# Patient Record
Sex: FEMALE | Race: WHITE | Employment: OTHER | URBAN - NONMETROPOLITAN AREA
[De-identification: names, ages, dates, MRNs, and addresses within clinical notes are randomized per-mention and may not be internally consistent; named-entity substitution may affect disease eponyms.]

---

## 2018-01-13 ENCOUNTER — HOSPITAL ENCOUNTER (EMERGENCY)
Age: 53
Discharge: HOME | End: 2018-01-13
Payer: COMMERCIAL

## 2018-01-13 VITALS
RESPIRATION RATE: 20 BRPM | WEIGHT: 175 LBS | OXYGEN SATURATION: 95 % | SYSTOLIC BLOOD PRESSURE: 155 MMHG | TEMPERATURE: 97.5 F | DIASTOLIC BLOOD PRESSURE: 84 MMHG | HEART RATE: 94 BPM

## 2018-01-13 DIAGNOSIS — J18.9 PNEUMONIA OF LEFT LOWER LOBE DUE TO INFECTIOUS ORGANISM: Primary | ICD-10-CM

## 2018-01-13 PROCEDURE — 99203 OFFICE O/P NEW LOW 30 MIN: CPT

## 2018-01-13 PROCEDURE — 99202 OFFICE O/P NEW SF 15 MIN: CPT | Performed by: NURSE PRACTITIONER

## 2018-01-13 RX ORDER — PREDNISONE 20 MG/1
20 TABLET ORAL 2 TIMES DAILY
Qty: 10 TABLET | Refills: 0 | Status: SHIPPED | OUTPATIENT
Start: 2018-01-13 | End: 2018-01-18

## 2018-01-13 RX ORDER — SIMVASTATIN 10 MG
10 TABLET ORAL NIGHTLY
COMMUNITY

## 2018-01-13 RX ORDER — LEVOFLOXACIN 500 MG/1
500 TABLET, FILM COATED ORAL DAILY
Qty: 7 TABLET | Refills: 0 | Status: SHIPPED | OUTPATIENT
Start: 2018-01-13 | End: 2018-01-20

## 2018-01-13 RX ORDER — BENZONATATE 200 MG/1
200 CAPSULE ORAL 3 TIMES DAILY PRN
Qty: 21 CAPSULE | Refills: 0 | Status: SHIPPED | OUTPATIENT
Start: 2018-01-13 | End: 2018-01-20

## 2018-01-13 RX ORDER — ESOMEPRAZOLE MAGNESIUM 40 MG/1
40 FOR SUSPENSION ORAL DAILY
COMMUNITY

## 2018-01-13 RX ORDER — ESCITALOPRAM OXALATE 20 MG/1
10 TABLET ORAL DAILY
COMMUNITY

## 2018-01-13 ASSESSMENT — PAIN DESCRIPTION - FREQUENCY: FREQUENCY: CONTINUOUS

## 2018-01-13 ASSESSMENT — PAIN DESCRIPTION - DESCRIPTORS: DESCRIPTORS: ACHING

## 2018-01-13 ASSESSMENT — PAIN DESCRIPTION - PAIN TYPE: TYPE: ACUTE PAIN

## 2018-01-13 ASSESSMENT — PAIN SCALES - GENERAL: PAINLEVEL_OUTOF10: 7

## 2018-01-13 ASSESSMENT — PAIN DESCRIPTION - LOCATION: LOCATION: HEAD;THROAT

## 2018-01-17 ASSESSMENT — ENCOUNTER SYMPTOMS
WHEEZING: 1
SORE THROAT: 1
ABDOMINAL PAIN: 0
COUGH: 1
SHORTNESS OF BREATH: 0

## 2019-09-18 NOTE — ED PROVIDER NOTES
----- Message from [employer]. sent at 9/18/2019  9:12 AM EDT -----  Regarding: Dr. Perla Dawkins General Message/Vendor Calls    Caller's first and last name: Thaddeus Mora (mother)      Reason for call: Regarding she is running about 45 mins late for her sons apt, she has her children back to back apts.        Call back required yes/no and why: No      Best contact number(s): [phone]      Details to clarify the request:      [employer]. Labs:No results found for this visit on 01/13/18. IMAGING:    URGENT CARE COURSE:     Vitals:    01/13/18 0942 01/13/18 1017   BP: (!) 175/95 (!) 155/84   Pulse: 94    Resp: 20    Temp: 97.5 °F (36.4 °C)    TempSrc: Temporal    SpO2: 95%    Weight: 175 lb (79.4 kg)      Patient appears ill but nontoxic. No distress noted with breathing. Left lower lobe with significant crackles; very harsh wet cough. Rest of lung sounds clear to ascultation. Bilateral tympanic membranes clear and intact. Right superficial adenopathy noted. Oropharynx without erythema. Sinuses nontender. Heart Sounds regular, S1,S2. Patient agreed to empiric treatment of left lower lobe pneumonia, to help avoid her deductible insurance costs, and avoid radiation. Medications - No data to display  PROCEDURES:  None  FINAL IMPRESSION      1. Pneumonia of left lower lobe due to infectious organism [facility])        DISPOSITION/PLAN   DISPOSITION Decision To Discharge 01/13/2018 09:57:30 AM    PATIENT REFERRED TO:  Susan Lombardi MD  [address]  [phone]    In 3 days  If symptoms worsen    DISCHARGE MEDICATIONS:  Discharge Medication List as of 1/13/2018 10:04 AM      START taking these medications    Details   levofloxacin (LEVAQUIN) 500 MG tablet Take 1 tablet by mouth daily for 7 days, Disp-7 tablet, R-0Normal      predniSONE (DELTASONE) 20 MG tablet Take 1 tablet by mouth 2 times daily for 5 days, Disp-10 tablet, R-0Normal      benzonatate (TESSALON) 200 MG capsule Take 1 capsule by mouth 3 times daily as needed for Cough, Disp-21 capsule, R-0Normal           Discharge Medication List as of 1/13/2018 10:04 AM          Bob Alvarado, NAYA Alvarado, NAYA  01/13/18 [address], NP  01/17/18 3365

## 2021-12-22 ENCOUNTER — HOSPITAL ENCOUNTER (OUTPATIENT)
Age: 56
Discharge: HOME | End: 2021-12-22
Payer: COMMERCIAL

## 2021-12-22 PROCEDURE — U0005 INFEC AGEN DETEC AMPLI PROBE: HCPCS

## 2021-12-22 PROCEDURE — U0003 INFECTIOUS AGENT DETECTION BY NUCLEIC ACID (DNA OR RNA); SEVERE ACUTE RESPIRATORY SYNDROME CORONAVIRUS 2 (SARS-COV-2) (CORONAVIRUS DISEASE [COVID-19]), AMPLIFIED PROBE TECHNIQUE, MAKING USE OF HIGH THROUGHPUT TECHNOLOGIES AS DESCRIBED BY CMS-2020-01-R: HCPCS

## 2021-12-23 ENCOUNTER — ANESTHESIA EVENT (OUTPATIENT)
Dept: OPERATING ROOM | Age: 56
End: 2021-12-23
Payer: COMMERCIAL

## 2021-12-23 LAB
SARS-COV-2: NOT DETECTED
SOURCE: NORMAL

## 2021-12-23 RX ORDER — UBIDECARENONE 200 MG
200 CAPSULE ORAL DAILY
COMMUNITY

## 2021-12-23 RX ORDER — EPINEPHRINE 0.3 MG/.3ML
0.3 INJECTION SUBCUTANEOUS PRN
COMMUNITY

## 2021-12-23 RX ORDER — LOSARTAN POTASSIUM 100 MG/1
100 TABLET ORAL DAILY
COMMUNITY

## 2021-12-23 RX ORDER — OMEGA-3S/DHA/EPA/FISH OIL/D3 300MG-1000
500 CAPSULE ORAL DAILY
COMMUNITY

## 2021-12-23 RX ORDER — VALACYCLOVIR HYDROCHLORIDE 1 G/1
2000 TABLET, FILM COATED ORAL 2 TIMES DAILY
COMMUNITY

## 2021-12-23 RX ORDER — ATORVASTATIN CALCIUM 20 MG/1
20 TABLET, FILM COATED ORAL DAILY
COMMUNITY

## 2021-12-23 RX ORDER — PROGESTERONE 100 MG/1
CAPSULE ORAL DAILY
COMMUNITY

## 2021-12-23 RX ORDER — SUMATRIPTAN 100 MG/1
100 TABLET, FILM COATED ORAL PRN
COMMUNITY

## 2021-12-23 RX ORDER — BUSPIRONE HYDROCHLORIDE 30 MG/1
30 TABLET ORAL 2 TIMES DAILY PRN
COMMUNITY

## 2021-12-23 RX ORDER — VITAMIN E 268 MG
400 CAPSULE ORAL DAILY
COMMUNITY

## 2021-12-23 RX ORDER — OMEGA-3/DHA/EPA/FISH OIL 300-1000MG
1200 CAPSULE ORAL DAILY
COMMUNITY

## 2021-12-23 ASSESSMENT — LIFESTYLE VARIABLES: SMOKING_STATUS: 1

## 2021-12-23 NOTE — PROGRESS NOTES
Surgery Date:  12/27/21                                  Arrive at: 0715  Surgery time: 0915      If your arrival time is before 7 AM come in the emergency room entrance. If after 7 AM come in the main entrance. One visitor may accompany you to the hospital.  Both of you must wear a mask and be free of covid symptoms. 1. Do not eat or drink anything after midnight - unless instructed by your doctor prior to surgery. This includes                  no water, chewing gum or mints. 2. Follow your directions as prescribed by the doctor for your procedure and medications. Take the following medications with a small sip of water the morning of: Nexium              3. Check with your Doctor regarding stopping Plavix, Coumadin, Lovenox, Effient, Pradaxa, Xarelto, Fragmin or other blood thinners and                  follow their instructions. 4. Do not smoke and do not drink any alcoholic beverages 24 hours prior to surgery. 5. You may brush your teeth and gargle the morning of surgery. DO NOT SWALLOW WATER  6. Please wear simple, loose fitting clothing to the hospital.  Sara Gtz not bring valuables (money, credit cards, checkbooks, etc.) Do not wear any                  makeup (including no eye makeup) or nail polish on your fingers or toes. 7.  DO NOT wear any jewelry or piercings on day of surgery. All body piercing jewelry must be removed. 8.  Take a shower the night before or morning of your procedure, do not apply any lotion, oil or powder. 9. If you have dentures, they will be removed before going to the OR; we will provide you a container. If you wear contact lenses or glasses,                 they will be removed; please bring a case for them. 10. If you  have a Living Will and Durable Power of  for Healthcare, please bring in a copy.            11. Please bring picture ID,  insurance card, paperwork from the doctors office    (H & P, Consent, & card for implantable devices). 12. You MUST make arrangements for a responsible adult to take you home after your surgery and be able to check on you every couple                  hours for the day. You will not be allowed to leave alone or drive yourself home. It is strongly suggested someone stay with you the first 24                  hrs. Your surgery will be cancelled if you do not have a ride home. 13. Wear a mask covering your nose & mouth when entering the hospital. Have your covid-19 test performed within 10 days of your                 surgery. Quarantine yourself after the test until after your surgery. Please call [phone] with any questions.

## 2021-12-23 NOTE — ANESTHESIA PRE PROCEDURE
Department of Anesthesiology  Preprocedure Note       Name:  Gaetano Marques   Age:  55 y.o.  :  1965                                          MRN:  [id number]         Date:  2021      Surgeon: Rafaela Garcia):  Annamaria Varela DPM    Procedure: Procedure(s):  LEFT ACHILLES TENDON LENGTHENING REPAIR  LEFT HEEL SPUR RESECTION    Medications prior to admission:   Prior to Admission medications    Medication Sig Start Date End Date Taking? Authorizing Provider   Phenylephrine-Pheniramine-DM [facility] COLD & COUGH) 10-20-20 MG PACK Take by mouth    Historical Provider, MD   dextromethorphan-guaiFENesin (MUCINEX DM)  MG per extended release tablet Take 1 tablet by mouth every 12 hours as needed    Historical Provider, MD   escitalopram (LEXAPRO) 20 MG tablet Take 20 mg by mouth daily    Historical Provider, MD   simvastatin (ZOCOR) 10 MG tablet Take 10 mg by mouth nightly    Historical Provider, MD   esomeprazole Magnesium (NEXIUM) 40 MG PACK Take 40 mg by mouth daily    Historical Provider, MD       Current medications:    No current facility-administered medications for this encounter. Current Outpatient Medications   Medication Sig Dispense Refill    Phenylephrine-Pheniramine-DM (THERAFLU COLD & COUGH) 10-20-20 MG PACK Take by mouth      dextromethorphan-guaiFENesin (MUCINEX DM)  MG per extended release tablet Take 1 tablet by mouth every 12 hours as needed      escitalopram (LEXAPRO) 20 MG tablet Take 20 mg by mouth daily      simvastatin (ZOCOR) 10 MG tablet Take 10 mg by mouth nightly      esomeprazole Magnesium (NEXIUM) 40 MG PACK Take 40 mg by mouth daily         Allergies:  No Known Allergies    Problem List:  There is no problem list on file for this patient.       Past Medical History:        Diagnosis Date    Headache     Heartburn     Hyperlipidemia        Past Surgical History:        Procedure Laterality Date    ELBOW SURGERY      WRIST SURGERY         Social History: Social History     Tobacco Use    Smoking status: Current Every Day Smoker     Packs/day: 0.50     Types: Cigarettes    Smokeless tobacco: Never Used   Substance Use Topics    Alcohol use: Yes     Comment: sometimes                                Ready to quit: Not Answered  Counseling given: Not Answered      Vital Signs (Current): There were no vitals filed for this visit. BP Readings from Last 3 Encounters:   01/13/18 (!) 155/84       NPO Status:                                                                                 BMI:   Wt Readings from Last 3 Encounters:   01/13/18 175 lb (79.4 kg)     There is no height or weight on file to calculate BMI.    CBC: No results found for: WBC, RBC, HGB, HCT, MCV, RDW, PLT    CMP: No results found for: NA, K, CL, CO2, BUN, CREATININE, GFRAA, AGRATIO, LABGLOM, GLUCOSE, PROT, CALCIUM, BILITOT, ALKPHOS, AST, ALT    POC Tests: No results for input(s): POCGLU, POCNA, POCK, POCCL, POCBUN, POCHEMO, POCHCT in the last 72 hours. Coags: No results found for: PROTIME, INR, APTT    HCG (If Applicable): No results found for: PREGTESTUR, PREGSERUM, HCG, HCGQUANT     ABGs: No results found for: PHART, PO2ART, ZNP8URV, ABG8XMW, BEART, H9ZXWACP     Type & Screen (If Applicable):  No results found for: LABABO, LABRH    Drug/Infectious Status (If Applicable):  No results found for: HIV, HEPCAB    COVID-19 Screening (If Applicable): No results found for: COVID19        Anesthesia Evaluation  Patient summary reviewed  Airway:         Dental:          Pulmonary:   (+) current smoker                           Cardiovascular:    (+) hyperlipidemia                  Neuro/Psych:   (+) headaches:,             GI/Hepatic/Renal:   (+) GERD:,           Endo/Other:                     Abdominal:             Vascular:           Other Findings:             Anesthesia Plan      general     ASA 2     (Covid test?)  Induction: intravenous.                           Terrell Pena, APRN - CRNA   12/23/2021

## 2021-12-27 ENCOUNTER — APPOINTMENT (OUTPATIENT)
Dept: GENERAL RADIOLOGY | Age: 56
End: 2021-12-27
Attending: PODIATRIST
Payer: COMMERCIAL

## 2021-12-27 ENCOUNTER — ANESTHESIA (OUTPATIENT)
Dept: OPERATING ROOM | Age: 56
End: 2021-12-27
Payer: COMMERCIAL

## 2021-12-27 ENCOUNTER — HOSPITAL ENCOUNTER (OUTPATIENT)
Age: 56
Setting detail: OUTPATIENT SURGERY
Discharge: HOME | End: 2021-12-27
Attending: PODIATRIST | Admitting: PODIATRIST
Payer: COMMERCIAL

## 2021-12-27 VITALS
RESPIRATION RATE: 16 BRPM | BODY MASS INDEX: 29.49 KG/M2 | SYSTOLIC BLOOD PRESSURE: 159 MMHG | OXYGEN SATURATION: 93 % | DIASTOLIC BLOOD PRESSURE: 80 MMHG | WEIGHT: 177 LBS | HEIGHT: 65 IN | HEART RATE: 92 BPM | TEMPERATURE: 98.7 F

## 2021-12-27 VITALS
DIASTOLIC BLOOD PRESSURE: 112 MMHG | OXYGEN SATURATION: 97 % | RESPIRATION RATE: 15 BRPM | SYSTOLIC BLOOD PRESSURE: 128 MMHG | TEMPERATURE: 97.7 F

## 2021-12-27 DIAGNOSIS — Z48.89 OTHER SPECIFIED AFTERCARE FOLLOWING SURGERY: Primary | ICD-10-CM

## 2021-12-27 DIAGNOSIS — M77.32 HEEL SPUR, LEFT: ICD-10-CM

## 2021-12-27 PROCEDURE — 7100000000 HC PACU RECOVERY - FIRST 15 MIN: Performed by: PODIATRIST

## 2021-12-27 PROCEDURE — 2720000010 HC SURG SUPPLY STERILE: Performed by: PODIATRIST

## 2021-12-27 PROCEDURE — 6360000002 HC RX W HCPCS: Performed by: PODIATRIST

## 2021-12-27 PROCEDURE — 2780000010 HC IMPLANT OTHER: Performed by: PODIATRIST

## 2021-12-27 PROCEDURE — 7100000001 HC PACU RECOVERY - ADDTL 15 MIN: Performed by: PODIATRIST

## 2021-12-27 PROCEDURE — C1713 ANCHOR/SCREW BN/BN,TIS/BN: HCPCS | Performed by: PODIATRIST

## 2021-12-27 PROCEDURE — 2580000003 HC RX 258: Performed by: PODIATRIST

## 2021-12-27 PROCEDURE — 7100000011 HC PHASE II RECOVERY - ADDTL 15 MIN: Performed by: PODIATRIST

## 2021-12-27 PROCEDURE — 7100000010 HC PHASE II RECOVERY - FIRST 15 MIN: Performed by: PODIATRIST

## 2021-12-27 PROCEDURE — 6360000002 HC RX W HCPCS: Performed by: NURSE ANESTHETIST, CERTIFIED REGISTERED

## 2021-12-27 PROCEDURE — 3700000001 HC ADD 15 MINUTES (ANESTHESIA): Performed by: PODIATRIST

## 2021-12-27 PROCEDURE — 2500000003 HC RX 250 WO HCPCS: Performed by: NURSE ANESTHETIST, CERTIFIED REGISTERED

## 2021-12-27 PROCEDURE — 3700000000 HC ANESTHESIA ATTENDED CARE: Performed by: PODIATRIST

## 2021-12-27 PROCEDURE — 73650 X-RAY EXAM OF HEEL: CPT

## 2021-12-27 PROCEDURE — 3600000013 HC SURGERY LEVEL 3 ADDTL 15MIN: Performed by: PODIATRIST

## 2021-12-27 PROCEDURE — 2500000003 HC RX 250 WO HCPCS: Performed by: PODIATRIST

## 2021-12-27 PROCEDURE — 3600000003 HC SURGERY LEVEL 3 BASE: Performed by: PODIATRIST

## 2021-12-27 PROCEDURE — 2709999900 HC NON-CHARGEABLE SUPPLY: Performed by: PODIATRIST

## 2021-12-27 DEVICE — FOOTPRINT ULTRA PK SUTURE ANCHOR,                                    4.5 MM, SL
Type: IMPLANTABLE DEVICE | Site: FOOT | Status: FUNCTIONAL
Brand: FOOTPRINT

## 2021-12-27 DEVICE — TWINFIX TITANIUM 3.5 MM SUTURE                                    ANCHOR WITH TWO ULTRABRAID SUTURES                                    NO.2 AND NEEDLES, SL
Type: IMPLANTABLE DEVICE | Site: FOOT | Status: FUNCTIONAL
Brand: TWINFIX

## 2021-12-27 RX ORDER — PROPOFOL 10 MG/ML
INJECTION, EMULSION INTRAVENOUS PRN
Status: DISCONTINUED | OUTPATIENT
Start: 2021-12-27 | End: 2021-12-27 | Stop reason: SDUPTHER

## 2021-12-27 RX ORDER — HYDROCODONE BITARTRATE AND ACETAMINOPHEN 5; 325 MG/1; MG/1
1 TABLET ORAL PRN
Status: DISCONTINUED | OUTPATIENT
Start: 2021-12-27 | End: 2021-12-27 | Stop reason: HOSPADM

## 2021-12-27 RX ORDER — DIPHENHYDRAMINE HYDROCHLORIDE 50 MG/ML
12.5 INJECTION INTRAMUSCULAR; INTRAVENOUS
Status: DISCONTINUED | OUTPATIENT
Start: 2021-12-27 | End: 2021-12-27 | Stop reason: HOSPADM

## 2021-12-27 RX ORDER — PROCHLORPERAZINE EDISYLATE 5 MG/ML
5 INJECTION INTRAMUSCULAR; INTRAVENOUS
Status: DISCONTINUED | OUTPATIENT
Start: 2021-12-27 | End: 2021-12-27 | Stop reason: HOSPADM

## 2021-12-27 RX ORDER — KETOROLAC TROMETHAMINE 30 MG/ML
INJECTION, SOLUTION INTRAMUSCULAR; INTRAVENOUS PRN
Status: DISCONTINUED | OUTPATIENT
Start: 2021-12-27 | End: 2021-12-27 | Stop reason: SDUPTHER

## 2021-12-27 RX ORDER — SODIUM CHLORIDE 0.9 % (FLUSH) 0.9 %
5-40 SYRINGE (ML) INJECTION PRN
Status: DISCONTINUED | OUTPATIENT
Start: 2021-12-27 | End: 2021-12-27 | Stop reason: HOSPADM

## 2021-12-27 RX ORDER — HYDROCODONE BITARTRATE AND ACETAMINOPHEN 5; 325 MG/1; MG/1
2 TABLET ORAL PRN
Status: DISCONTINUED | OUTPATIENT
Start: 2021-12-27 | End: 2021-12-27 | Stop reason: HOSPADM

## 2021-12-27 RX ORDER — ONDANSETRON 2 MG/ML
INJECTION INTRAMUSCULAR; INTRAVENOUS PRN
Status: DISCONTINUED | OUTPATIENT
Start: 2021-12-27 | End: 2021-12-27 | Stop reason: SDUPTHER

## 2021-12-27 RX ORDER — OXYCODONE HYDROCHLORIDE AND ACETAMINOPHEN 5; 325 MG/1; MG/1
1 TABLET ORAL EVERY 6 HOURS PRN
Qty: 28 TABLET | Refills: 0 | Status: SHIPPED | OUTPATIENT
Start: 2021-12-27 | End: 2022-01-03

## 2021-12-27 RX ORDER — 0.9 % SODIUM CHLORIDE 0.9 %
500 INTRAVENOUS SOLUTION INTRAVENOUS
Status: DISCONTINUED | OUTPATIENT
Start: 2021-12-27 | End: 2021-12-27 | Stop reason: HOSPADM

## 2021-12-27 RX ORDER — KETAMINE HCL 50MG/ML(1)
SYRINGE (ML) INTRAVENOUS PRN
Status: DISCONTINUED | OUTPATIENT
Start: 2021-12-27 | End: 2021-12-27 | Stop reason: SDUPTHER

## 2021-12-27 RX ORDER — CEPHALEXIN 500 MG/1
500 CAPSULE ORAL 2 TIMES DAILY
Qty: 20 CAPSULE | Refills: 0 | Status: SHIPPED | OUTPATIENT
Start: 2021-12-27

## 2021-12-27 RX ORDER — MEPERIDINE HYDROCHLORIDE 25 MG/ML
12.5 INJECTION INTRAMUSCULAR; INTRAVENOUS; SUBCUTANEOUS EVERY 5 MIN PRN
Status: DISCONTINUED | OUTPATIENT
Start: 2021-12-27 | End: 2021-12-27 | Stop reason: HOSPADM

## 2021-12-27 RX ORDER — LIDOCAINE HYDROCHLORIDE 20 MG/ML
INJECTION, SOLUTION INTRAVENOUS PRN
Status: DISCONTINUED | OUTPATIENT
Start: 2021-12-27 | End: 2021-12-27 | Stop reason: SDUPTHER

## 2021-12-27 RX ORDER — ROCURONIUM BROMIDE 10 MG/ML
INJECTION, SOLUTION INTRAVENOUS PRN
Status: DISCONTINUED | OUTPATIENT
Start: 2021-12-27 | End: 2021-12-27 | Stop reason: SDUPTHER

## 2021-12-27 RX ORDER — SODIUM CHLORIDE, SODIUM LACTATE, POTASSIUM CHLORIDE, CALCIUM CHLORIDE 600; 310; 30; 20 MG/100ML; MG/100ML; MG/100ML; MG/100ML
1000 INJECTION, SOLUTION INTRAVENOUS CONTINUOUS
Status: DISCONTINUED | OUTPATIENT
Start: 2021-12-27 | End: 2021-12-27 | Stop reason: HOSPADM

## 2021-12-27 RX ORDER — FENTANYL CITRATE 50 UG/ML
25 INJECTION, SOLUTION INTRAMUSCULAR; INTRAVENOUS EVERY 5 MIN PRN
Status: DISCONTINUED | OUTPATIENT
Start: 2021-12-27 | End: 2021-12-27 | Stop reason: HOSPADM

## 2021-12-27 RX ORDER — CEFAZOLIN SODIUM 2 G/100ML
2000 INJECTION, SOLUTION INTRAVENOUS ONCE
Status: DISCONTINUED | OUTPATIENT
Start: 2021-12-27 | End: 2021-12-27 | Stop reason: RX

## 2021-12-27 RX ORDER — BUPIVACAINE HYDROCHLORIDE 5 MG/ML
INJECTION, SOLUTION EPIDURAL; INTRACAUDAL
Status: COMPLETED | OUTPATIENT
Start: 2021-12-27 | End: 2021-12-27

## 2021-12-27 RX ORDER — FENTANYL CITRATE 50 UG/ML
INJECTION, SOLUTION INTRAMUSCULAR; INTRAVENOUS PRN
Status: DISCONTINUED | OUTPATIENT
Start: 2021-12-27 | End: 2021-12-27 | Stop reason: SDUPTHER

## 2021-12-27 RX ORDER — HYDRALAZINE HYDROCHLORIDE 20 MG/ML
5 INJECTION INTRAMUSCULAR; INTRAVENOUS EVERY 10 MIN PRN
Status: DISCONTINUED | OUTPATIENT
Start: 2021-12-27 | End: 2021-12-27 | Stop reason: HOSPADM

## 2021-12-27 RX ORDER — PROMETHAZINE HYDROCHLORIDE 25 MG/ML
6.25 INJECTION, SOLUTION INTRAMUSCULAR; INTRAVENOUS
Status: DISCONTINUED | OUTPATIENT
Start: 2021-12-27 | End: 2021-12-27 | Stop reason: HOSPADM

## 2021-12-27 RX ORDER — DEXAMETHASONE SODIUM PHOSPHATE 4 MG/ML
INJECTION, SOLUTION INTRA-ARTICULAR; INTRALESIONAL; INTRAMUSCULAR; INTRAVENOUS; SOFT TISSUE PRN
Status: DISCONTINUED | OUTPATIENT
Start: 2021-12-27 | End: 2021-12-27 | Stop reason: SDUPTHER

## 2021-12-27 RX ADMIN — SUGAMMADEX 200 MG: 100 INJECTION, SOLUTION INTRAVENOUS at 11:15

## 2021-12-27 RX ADMIN — CEFAZOLIN 2000 ML: 1 INJECTION, POWDER, FOR SOLUTION INTRAMUSCULAR; INTRAVENOUS; PARENTERAL at 10:17

## 2021-12-27 RX ADMIN — HYDROMORPHONE HYDROCHLORIDE 0.5 MG: 1 INJECTION, SOLUTION INTRAMUSCULAR; INTRAVENOUS; SUBCUTANEOUS at 10:37

## 2021-12-27 RX ADMIN — HYDROMORPHONE HYDROCHLORIDE 0.5 MG: 1 INJECTION, SOLUTION INTRAMUSCULAR; INTRAVENOUS; SUBCUTANEOUS at 11:04

## 2021-12-27 RX ADMIN — DEXAMETHASONE SODIUM PHOSPHATE 8 MG: 4 INJECTION, SOLUTION INTRAMUSCULAR; INTRAVENOUS at 10:08

## 2021-12-27 RX ADMIN — KETOROLAC TROMETHAMINE 15 MG: 30 INJECTION, SOLUTION INTRAMUSCULAR; INTRAVENOUS at 11:04

## 2021-12-27 RX ADMIN — FENTANYL CITRATE 100 MCG: 50 INJECTION, SOLUTION INTRAMUSCULAR; INTRAVENOUS at 10:08

## 2021-12-27 RX ADMIN — ONDANSETRON HYDROCHLORIDE 4 MG: 4 INJECTION, SOLUTION INTRAMUSCULAR; INTRAVENOUS at 10:08

## 2021-12-27 RX ADMIN — Medication 50 MG: at 10:08

## 2021-12-27 RX ADMIN — PROPOFOL 140 MG: 10 INJECTION, EMULSION INTRAVENOUS at 10:08

## 2021-12-27 RX ADMIN — ROCURONIUM BROMIDE 50 MG: 10 SOLUTION INTRAVENOUS at 10:08

## 2021-12-27 RX ADMIN — SODIUM CHLORIDE, PRESERVATIVE FREE 10 ML: 5 INJECTION INTRAVENOUS at 08:01

## 2021-12-27 RX ADMIN — SODIUM CHLORIDE, POTASSIUM CHLORIDE, SODIUM LACTATE AND CALCIUM CHLORIDE 1000 ML: 600; 310; 30; 20 INJECTION, SOLUTION INTRAVENOUS at 08:00

## 2021-12-27 RX ADMIN — LIDOCAINE HYDROCHLORIDE 100 MG: 20 INJECTION, SOLUTION INTRAVENOUS at 10:08

## 2021-12-27 ASSESSMENT — PULMONARY FUNCTION TESTS
PIF_VALUE: 21
PIF_VALUE: 22
PIF_VALUE: 1
PIF_VALUE: 19
PIF_VALUE: 19
PIF_VALUE: 20
PIF_VALUE: 13
PIF_VALUE: 13
PIF_VALUE: 1
PIF_VALUE: 20
PIF_VALUE: 13
PIF_VALUE: 20
PIF_VALUE: 19
PIF_VALUE: 20
PIF_VALUE: 1
PIF_VALUE: 20
PIF_VALUE: 1
PIF_VALUE: 13
PIF_VALUE: 21
PIF_VALUE: 20
PIF_VALUE: 22
PIF_VALUE: 19
PIF_VALUE: 21
PIF_VALUE: 21
PIF_VALUE: 2
PIF_VALUE: 20
PIF_VALUE: 21
PIF_VALUE: 13
PIF_VALUE: 19
PIF_VALUE: 20
PIF_VALUE: 29
PIF_VALUE: 15
PIF_VALUE: 25
PIF_VALUE: 22
PIF_VALUE: 13
PIF_VALUE: 20
PIF_VALUE: 15
PIF_VALUE: 20
PIF_VALUE: 20
PIF_VALUE: 19
PIF_VALUE: 20
PIF_VALUE: 19
PIF_VALUE: 1
PIF_VALUE: 20
PIF_VALUE: 25
PIF_VALUE: 21
PIF_VALUE: 3
PIF_VALUE: 3
PIF_VALUE: 20
PIF_VALUE: 20
PIF_VALUE: 1
PIF_VALUE: 20
PIF_VALUE: 20
PIF_VALUE: 14
PIF_VALUE: 20
PIF_VALUE: 0
PIF_VALUE: 21
PIF_VALUE: 21
PIF_VALUE: 29
PIF_VALUE: 13
PIF_VALUE: 13
PIF_VALUE: 20
PIF_VALUE: 20
PIF_VALUE: 13
PIF_VALUE: 22
PIF_VALUE: 20
PIF_VALUE: 21
PIF_VALUE: 20
PIF_VALUE: 30
PIF_VALUE: 13
PIF_VALUE: 20
PIF_VALUE: 20
PIF_VALUE: 18
PIF_VALUE: 20
PIF_VALUE: 20

## 2021-12-27 ASSESSMENT — PAIN DESCRIPTION - ONSET
ONSET: ON-GOING
ONSET: ON-GOING

## 2021-12-27 ASSESSMENT — LIFESTYLE VARIABLES: SMOKING_STATUS: 1

## 2021-12-27 ASSESSMENT — PAIN DESCRIPTION - LOCATION
LOCATION: FOOT
LOCATION: FOOT

## 2021-12-27 ASSESSMENT — PAIN DESCRIPTION - PROGRESSION
CLINICAL_PROGRESSION: NOT CHANGED
CLINICAL_PROGRESSION: NOT CHANGED

## 2021-12-27 ASSESSMENT — PAIN DESCRIPTION - PAIN TYPE
TYPE: SURGICAL PAIN
TYPE: SURGICAL PAIN

## 2021-12-27 ASSESSMENT — PAIN DESCRIPTION - FREQUENCY
FREQUENCY: INTERMITTENT
FREQUENCY: INTERMITTENT

## 2021-12-27 ASSESSMENT — PAIN DESCRIPTION - ORIENTATION
ORIENTATION: LEFT
ORIENTATION: LEFT

## 2021-12-27 ASSESSMENT — PAIN DESCRIPTION - DESCRIPTORS
DESCRIPTORS: ACHING
DESCRIPTORS: ACHING

## 2021-12-27 ASSESSMENT — PAIN SCALES - GENERAL
PAINLEVEL_OUTOF10: 3
PAINLEVEL_OUTOF10: 3
PAINLEVEL_OUTOF10: 2

## 2021-12-27 NOTE — ANESTHESIA PRE PROCEDURE
Department of Anesthesiology  Preprocedure Note       Name:  Darnell Monson   Age:  55 y.o.  :  1965                                          MRN:  [id number]         Date:  2021      Surgeon: Deanne Sesay):  Elda Hernandez DPM    Procedure: Procedure(s):  LEFT ACHILLES TENDON LENGTHENING REPAIR  LEFT HEEL SPUR RESECTION    Medications prior to admission:   Prior to Admission medications    Medication Sig Start Date End Date Taking?  Authorizing Provider   LACTOBACILLUS ACID-PECTIN PO Take 1 capsule by mouth daily   Yes Historical Provider, MD   vitamin D3 (CHOLECALCIFEROL) 10 MCG (400 UNIT) TABS tablet Take 500 Units by mouth daily   Yes Historical Provider, MD   vitamin E 400 UNIT capsule Take 400 Units by mouth daily   Yes Historical Provider, MD   fish oil-omega-3 fatty acids 1000 MG capsule Take 1,200 mg by mouth daily   Yes Historical Provider, MD   progesterone (PROMETRIUM) 100 MG CAPS capsule Take by mouth daily   Yes Historical Provider, MD   coenzyme Q10 200 MG CAPS capsule Take 200 mg by mouth daily   Yes Historical Provider, MD   valACYclovir (VALTREX) 1 g tablet Take 2,000 mg by mouth 2 times daily   Yes Historical Provider, MD   SUMAtriptan (IMITREX) 100 MG tablet Take 100 mg by mouth as needed for Migraine   Yes Historical Provider, MD   busPIRone (BUSPAR) 30 MG tablet Take 30 mg by mouth 2 times daily as needed   Yes Historical Provider, MD   atorvastatin (LIPITOR) 20 MG tablet Take 20 mg by mouth daily   Yes Historical Provider, MD   losartan (COZAAR) 100 MG tablet Take 100 mg by mouth daily   Yes Historical Provider, MD   dextromethorphan-guaiFENesin (MUCINEX DM)  MG per extended release tablet Take 1 tablet by mouth every 12 hours as needed   Yes Historical Provider, MD   escitalopram (LEXAPRO) 20 MG tablet Take 10 mg by mouth daily    Yes Historical Provider, MD   simvastatin (ZOCOR) 10 MG tablet Take 10 mg by mouth nightly   Yes Historical Provider, MD   esomeprazole BP Readings from Last 3 Encounters:   12/27/21 (!) 154/90   01/13/18 (!) 155/84       NPO Status: Time of last liquid consumption: 0500 (tiny sip of water with am med)                        Time of last solid consumption: 2000                        Date of last liquid consumption: 12/27/21                        Date of last solid food consumption: 12/26/21    BMI:   Wt Readings from Last 3 Encounters:   12/23/21 177 lb (80.3 kg)   01/13/18 175 lb (79.4 kg)     Body mass index is 29.91 kg/m². CBC: No results found for: WBC, RBC, HGB, HCT, MCV, RDW, PLT    CMP: No results found for: NA, K, CL, CO2, BUN, CREATININE, GFRAA, AGRATIO, LABGLOM, GLUCOSE, GLU, PROT, CALCIUM, BILITOT, ALKPHOS, AST, ALT    POC Tests: No results for input(s): POCGLU, POCNA, POCK, POCCL, POCBUN, POCHEMO, POCHCT in the last 72 hours. Coags: No results found for: PROTIME, INR, APTT    HCG (If Applicable): No results found for: PREGTESTUR, PREGSERUM, HCG, HCGQUANT     ABGs: No results found for: PHART, PO2ART, TLA2ONV, YHT4RTI, BEART, Z6YROWLQ     Type & Screen (If Applicable):  No results found for: LABABO, LABRH    Drug/Infectious Status (If Applicable):  No results found for: HIV, HEPCAB    COVID-19 Screening (If Applicable):   Lab Results   Component Value Date    COVID19 NOT DETECTED 12/22/2021           Anesthesia Evaluation  Patient summary reviewed  Airway: Mallampati: II  TM distance: >3 FB     Mouth opening: > = 3 FB Dental: normal exam         Pulmonary:normal exam    (+) current smoker                           Cardiovascular:    (+) hyperlipidemia                  Neuro/Psych:   (+) headaches:,             GI/Hepatic/Renal:   (+) GERD:,           Endo/Other:                     Abdominal:             Vascular: Other Findings:             Anesthesia Plan      general     ASA 2       Induction: intravenous. Anesthetic plan and risks discussed with patient.       Plan discussed with surgical team.                  Belle Osgood, MELVIN - CRNA   12/27/2021

## 2021-12-27 NOTE — PROGRESS NOTES
Pt. Arrived in PACU via cart from the OR. Oxygen applied via simple mask at 6L. Attached to monitor, vs stable. Pt. Bethany [place], but arouses to voice and touch. Denies pain or nausea at this time. Incision to left foot covered with dry/intact dressing. Left foot elevated, ice pack placed behind the knee, above the dressing. IV infusing without difficulty. SCD to RLE. Bedside report received from [place], [place] [facility] [employer],  CRNA. Will continue to monitor via bedside.

## 2021-12-27 NOTE — H&P
Update History & Physical    The patient's History and Physical of December 1, 2021 in paper chart from PCP was reviewed with the patient and I examined the patient. There was no change. The surgical site was confirmed by the patient and me. Plan: The risks, benefits, expected outcome, and alternative to the recommended procedure have been discussed with the patient. Patient understands and wants to proceed with the procedure.      Electronically signed by Nick Brandt DPM on 12/27/2021 at 7:27 AM

## 2021-12-27 NOTE — PROGRESS NOTES
1310- pt. Is alert and orientedx4. Pt. Is currently on RA sating 92-95%. Encouraged pt. To continue to cough and deep breathe. Incentive spirometer given to patient. Education provided. Pt. Is able tolerate eating and drinking. Dressing is clean dry and intact. Pt. Has a splint in her dressing. She states has a walker and crutches at home.  is at bedside. Written and verbal instructions given. Written prescriptions were given. They verbalized understanding and denies any other questions or concerns at this time. Pt. Was taken to main entrance via wheelchair and was discharged home with her  in a private vehicle.

## 2021-12-27 NOTE — PROGRESS NOTES
Pt. Awake, alert, and oriented x 4. On room air, vs stable. Pt. Denies nausea and is tolerating ice chips. Pain is unchanged at a 2/10. Pt. Talking and laughing. Dressing to left foot is dry/intact. No drainage noted. Ice pack in place. Toes warm, pink with cap refill < 3 sec. Bilat. Pedal pulses present and palpable. Pt. Ready for discharge from PACU.

## 2021-12-27 NOTE — BRIEF OP NOTE
Brief Postoperative Note      Patient: Shan Collado  YOB: 1965  MRN: [id number]    Date of Procedure: 12/27/2021    Pre-Op Diagnosis: Calcaneal spur of left foot [M77.32] Achilles tendinitis, left leg [M76.62] Contracture, left ankle [M24.572]    Post-Op Diagnosis: Same       Procedure(s):  LEFT ACHILLES TENDON LENGTHENING REPAIR  LEFT HEEL SPUR RESECTION   FLUOROSCOPY  APPLICATION OF POSTERIOR SPLINT    Surgeon(s):  Danny Grant DPM    Assistant:  * No surgical staff found *    Anesthesia: General    Estimated Blood Loss (mL): Minimal    Complications: None    Specimens:   * No specimens in log *    Implants:  Implant Name Type Inv.  Item Serial No.  Lot No. LRB No. Used Action   ANCHOR SUT DIA4.5MM SL FOOTPRINT ULT PK  ANCHOR SUT DIA4.5MM SL FOOTPRINT ULT PK  [employer] ENDOSCOPY-WD [id number] Left 1 Implanted   ANCHOR SUT 3.5MM W/ NDL TI TWINFIX  ANCHOR SUT 3.5MM W/ NDL TI TWINFIX  BLOOM [employer] NEPHEW [employer] Batch [id number] Left 2 Implanted   ANCHOR SUT DIA4.5MM SL FOOTPRINT ULT PK  ANCHOR SUT DIA4.5MM SL FOOTPRINT ULT PK  [employer] ENDOSCOPY-WD [id number] Left 1 Implanted   AMNIOS RT ACELLULAR LIQUID AMNION 2.0ML     BONE BANK ALLOGRAFTS-PMM  Left 1 Implanted         Drains: * No LDAs found *    Findings: large spur removed in total, reapproximated achilles tendon without complication, insertional tear repaired    Electronically signed by Danny Grant DPM on 12/27/2021 at 11:22 AM

## 2021-12-27 NOTE — ANESTHESIA POSTPROCEDURE EVALUATION
Department of Anesthesiology  Postprocedure Note    Patient: Clifford Umana  MRN: [id number]  YOB: 1965  Date of evaluation: 12/27/2021  Time:  11:44 AM     Procedure Summary     Date: 12/27/21 Room / Location: [address] [place] [address] 01 / [facility]    Anesthesia Start: 1002 Anesthesia Stop: 1130    Procedures:       LEFT ACHILLES TENDON LENGTHENING REPAIR (Left Foot)      LEFT HEEL SPUR RESECTION (Left Foot) Diagnosis:       Calcaneal spur of left foot      Achilles tendinitis, left leg      Contracture, left ankle      (Calcaneal spur of left foot [M77.32] Achilles tendinitis, left leg [M76.62] Contracture, left ankle [M24.572])    Surgeons: Ash Luna DPM Responsible Provider: MELVIN Bahena CRNA    Anesthesia Type: general ASA Status: 2          Anesthesia Type: general    Denys Phase I: Denys Score: 8    Denys Phase II:      Last vitals: Reviewed and per EMR flowsheets.        Anesthesia Post Evaluation    Patient location during evaluation: bedside  Patient participation: complete - patient participated  Level of consciousness: awake and awake and alert  Pain score: 1  Airway patency: patent  Nausea & Vomiting: no nausea and no vomiting  Complications: no  Cardiovascular status: hemodynamically stable  Respiratory status: acceptable  Hydration status: euvolemic

## 2021-12-27 NOTE — PROGRESS NOTES
1203- pt. Arrived to \[facility]\"" RM 1106 from pacu. via surgical cart. Brakes applied. Pt. Is alert and orientedx4. She rates pain a 3/10. Denies n/v. Pt. Dressing is clean dry and intact. Lung sounds clear to auscultation. Pt. Currently on RA sating 94%. Pt. Would like something to eat and drink. IV infusing without difficulty. She denies any other needs or questions at this itme.

## 2021-12-28 NOTE — OP NOTE
[facility]                  [address] [facility], [address] [employer] [address] [employer]                                OPERATIVE REPORT    PATIENT NAME: Shira Dominguez                 :        1965  MED REC NO:   [id number]                          ROOM:  ACCOUNT NO:   [de-identified]                           ADMIT DATE: 2021  PROVIDER:     Heide Jacob DPM    DATE OF PROCEDURE:  2021    SURGEON:  Heide Jacob DPM    PREOPERATIVE DIAGNOSES:  1.  Calcaneal spur, left foot. 2.  Achilles tendonitis, left foot. 3.  Contracture of ankle, left. 4.  Achilles tear. POSTOPERATIVE DIAGNOSES:  1.  Calcaneal spur, left foot. 2.  Achilles tendonitis, left foot. 3.  Contracture of ankle, left. 4.  Achilles tear. PROCEDURES:  1. Left Achilles tendon repair. 2.  Heel spur resection, left. 3.  Intraoperative fluoroscopy. 4.  Application, posterior splint. PATHOLOGY:  Zero. ANESTHESIA:  General.    HEMOSTASIS:  Pneumatic thigh tourniquet at 350 mmHg. ESTIMATED BLOOD LOSS:  Less than 5 mL. MATERIALS:  Smith and Nephew suture anchors x4, Artelon injectable  graft, Vicryl, Prolene, ZipLine. COMPLICATIONS:  Zero. INDICATIONS FOR PROCEDURE:  The patient is a pleasant 55-year-old female  with noted spur to the left posterior calcaneus. The patient had  difficulty with shoe gear as well as pain during ambulation. She did  wish for surgical correction. Clinical and radiographic evaluation  correlated the above diagnosis. Consent signed and reviewed in the  chart. No guarantee was made as to the outcome of the procedure. The  patient's questions and concerns were answered prior to the start of the  procedure. The patient wished to proceed with the procedure at this  time. PROCEDURE IN DETAIL:  Under mild sedation, the patient was brought to  the operating room. A well-padded pneumatic thigh tourniquet was placed  about the patient's left thigh.   The patient was gently sedated and  placed in the prone position. Left leg was scrubbed, prepped, and  draped in the usual aseptic manner. Left leg was elevated. Pneumatic  thigh tourniquet was inflated to 350 mmHg. Attention was directed to the posterior aspect of the patient's left leg  where a 7-cm linear incision was made, extending midline along the  patient's heel, terminating proximally midline along the watershed area  of the Achilles tendon. Incision was deepened through subcutaneous  tissue with care being taken to identify and retract any vital  neurovascular structures. All bleeders were cauterized and ligated as  necessary. Dissection was continued deep to the level of the paratenon  which was linearly excised and reflected. The Achilles tendon was  identified. Very large posterior spur was readily identified in the  operative field at the Achilles tendon insertion. Achilles tendon was  split midline and then transected at the patient's insertion site and  reflected medially and laterally exposing the posterior calcaneus at  this time. An osteotome was used to resect the very large posterior  spur which was removed in toto. Rongeurs were used to remove any of the  extra ends of the spur, both medially and laterally and an oscillating  bone rasp was used to smooth any of those rough edges made by the cut  with the osteotome. Area was flushed with copious amounts of sterile normal saline. Achilles tendon was debrided removing any devitalized ends of the  tissue. The ends were noted to be very frayed and breakdown was noted  centrally. This was also removed sharply with 15 blade. The Achilles  tendon was then placed back onto the patient's calcaneus and affixed  using Smith and Nephew tendon anchors x4. Achilles tendon was then  repaired with 5-0 Prolene in running interlocking technique. The  paratenon was repaired with 4-0 Vicryl.   The deep layer was closed with  3-0 Vicryl subcuticular closure with 4-0 Vicryl and the skin was then  repaired with ZipLine skin closure system. Following the procedure, amniotic injectable graft was introduced along  the insertion of the Achilles tendon on the calcaneus at the site of  repair to reduce postoperative healing rate and pain. 10 mL of 0.5%  Marcaine plain was also injected about the incision site. The incision  was then dressed with Betadine-soaked Adaptic, covered with sterile  compressive dressing consisting of 4 x 4's, Kerlix, three soft rolls,  and a 4-inch posterior splint held securely with double 6-inch Ace  bandage. The pneumatic thigh tourniquet was deflated and a prompt  hyperemic response was noted to all digits of the patient's left foot. The patient tolerated the procedure and anesthesia well, was transferred  to PACU with vital signs stable and vascular status intact. After a brief period of postop monitoring, the patient will be  discharged home with written and oral postop instructions, take all pain  medications and antibiotics as instructed, remain strict  nonweightbearing to the left leg. Any postoperative complications,  please have me paged directly.         Ricky Maldonado    D: 12/27/2021 11:40:18       T: 12/27/2021 14:07:32     [email or web address]  Job#: [id number]     Doc#: [id number]    CC:  Rory Cash DPM

## (undated) DEVICE — CUFF REPROCESS BP TOURN SING BLDR 2 PORT 4X24IN

## (undated) DEVICE — GLOVE ORANGE PI 8   MSG9080

## (undated) DEVICE — ZIP 8I SURGICAL SKIN CLOSURE DEVICE: Brand: ZIP 8I SURGICAL SKIN CLOSURE DEVICE

## (undated) DEVICE — SUTURE VCRL SZ 4-0 L18IN ABSRB UD L13MM P-3 3/8 CIR PRIM J494H

## (undated) DEVICE — NEEDLE HYPO 25GA L1.5IN BLU POLYPR HUB S STL REG BVL STR

## (undated) DEVICE — SNAP KOVER: Brand: UNBRANDED

## (undated) DEVICE — BANDAGE COMPR W4INXL5YD WHT BGE POLY COT M E WRP WV HK AND

## (undated) DEVICE — CONVERTORS STOCKINETTE: Brand: CONVERTORS

## (undated) DEVICE — TUBING, SUCTION, 1/4" X 10', STRAIGHT: Brand: MEDLINE

## (undated) DEVICE — GOWN,ECLIPSE,POLYRNF,BRTHSLV,XL,30/CS: Brand: MEDLINE

## (undated) DEVICE — MARKER SURG SKIN UTIL REGULAR/FINE 2 TIP W/ RUL AND 9 LBL

## (undated) DEVICE — LINER SUCT CANSTR 1500CC SEMI RIG W/ POR HYDROPHOBIC SHUT

## (undated) DEVICE — ELECTRODE ES AD CRDLSS PT RET REM POLYHESIVE

## (undated) DEVICE — GLOVE SURG SZ 65 THK91MIL LTX FREE SYN POLYISOPRENE

## (undated) DEVICE — TOWEL,OR,DSP,ST,BLUE,STD,6/PK,12PK/CS: Brand: MEDLINE

## (undated) DEVICE — SUTURE VCRL SZ 2-0 L18IN ABSRB UD CT-1 L36MM 1/2 CIR J839D

## (undated) DEVICE — PENCIL,CAUTERY,ROCKER,PTFE,15'CORD: Brand: MEDLINE INDUSTRIES, INC.

## (undated) DEVICE — COUNTER NDL 30 COUNT FOAM STRP SGL MAG

## (undated) DEVICE — SOLUTION PREP POVIDONE IOD FOR SKIN MUCOUS MEM PRIOR TO

## (undated) DEVICE — GLOVE ORANGE PI 7   MSG9070

## (undated) DEVICE — SUTURE VCRL SZ 3-0 L18IN ABSRB UD L26MM SH 1/2 CIR J864D

## (undated) DEVICE — SYRINGE MED 10ML LUERLOCK TIP W/O SFTY DISP

## (undated) DEVICE — SUTURE VCRL SZ 4-0 L18IN ABSRB UD L19MM PS-2 3/8 CIR PRIM J496H

## (undated) DEVICE — RASP SURG L W0.27XL0.55IN TEAR CRSS CUT MIC RECIP SAW

## (undated) DEVICE — COUNTER NDL 60 COUNT FOAM STRP SGL MAG

## (undated) DEVICE — GAUZE,SPONGE,4"X4",16PLY,XRAY,STRL,LF: Brand: MEDLINE

## (undated) DEVICE — BANDAGE COMPR M W6INXL10YD WHT BGE VELC E MTRX HK AND LOOP

## (undated) DEVICE — DRILL 4MM FOR 4.5MM ANCHOR SL-PLUS

## (undated) DEVICE — SYRINGE IRRIG 60ML SFT PLIABLE BLB EZ TO GRP 1 HND USE W/

## (undated) DEVICE — DRAPE,EXTREMITY,89X128,STERILE: Brand: MEDLINE

## (undated) DEVICE — YANKAUER,FLEXIBLE HANDLE,REGLR CAPACITY: Brand: MEDLINE INDUSTRIES, INC.

## (undated) DEVICE — BLADE SURG NO15 C STL SHRP PREM

## (undated) DEVICE — CHLORAPREP 26ML ORANGE

## (undated) DEVICE — SUTURE PROL SZ 5-0 L18IN NONABSORBABLE BLU L13MM P-3 3/8 8698G

## (undated) DEVICE — PACK,BASIC,IX: Brand: MEDLINE

## (undated) DEVICE — BANDAGE,GAUZE,BULKEE II,4.5"X4.1YD,STRL: Brand: MEDLINE

## (undated) DEVICE — PADDING UNDERCAST W4INXL4YD 100% COT CRIMPED FINISH WBRL II

## (undated) DEVICE — CONTAINER,SPECIMEN,OR STERILE,4OZ: Brand: MEDLINE

## (undated) DEVICE — CURITY NON-ADHERENT STRIPS: Brand: CURITY

## (undated) DEVICE — SPONGE GZ W4XL8IN COT WVN 12 PLY

## (undated) DEVICE — BANDAGE,ELASTIC,ESMARK,STERILE,4"X9',LF: Brand: MEDLINE

## (undated) DEVICE — BANDAGE,SELF ADHRNT,COFLEX,4"X5YD,STRL: Brand: COLABEL